# Patient Record
Sex: MALE | Race: WHITE | NOT HISPANIC OR LATINO | Employment: FULL TIME | URBAN - METROPOLITAN AREA
[De-identification: names, ages, dates, MRNs, and addresses within clinical notes are randomized per-mention and may not be internally consistent; named-entity substitution may affect disease eponyms.]

---

## 2023-06-08 ENCOUNTER — OFFICE VISIT (OUTPATIENT)
Dept: INTERNAL MEDICINE | Facility: CLINIC | Age: 43
End: 2023-06-08
Payer: COMMERCIAL

## 2023-06-08 VITALS
HEIGHT: 68 IN | TEMPERATURE: 98.4 F | HEART RATE: 80 BPM | WEIGHT: 211 LBS | BODY MASS INDEX: 31.98 KG/M2 | SYSTOLIC BLOOD PRESSURE: 128 MMHG | DIASTOLIC BLOOD PRESSURE: 84 MMHG

## 2023-06-08 DIAGNOSIS — F17.200 SMOKER: ICD-10-CM

## 2023-06-08 DIAGNOSIS — Z13.21 ENCOUNTER FOR VITAMIN DEFICIENCY SCREENING: ICD-10-CM

## 2023-06-08 DIAGNOSIS — M25.551 RIGHT HIP PAIN: ICD-10-CM

## 2023-06-08 DIAGNOSIS — Z13.29 SCREENING FOR ENDOCRINE DISORDER: ICD-10-CM

## 2023-06-08 DIAGNOSIS — D22.9 MULTIPLE ATYPICAL SKIN MOLES: ICD-10-CM

## 2023-06-08 DIAGNOSIS — Z11.59 ENCOUNTER FOR HEPATITIS C SCREENING TEST FOR LOW RISK PATIENT: ICD-10-CM

## 2023-06-08 DIAGNOSIS — Z13.220 LIPID SCREENING: ICD-10-CM

## 2023-06-08 DIAGNOSIS — R06.83 SNORING: ICD-10-CM

## 2023-06-08 DIAGNOSIS — Z13.0 SCREENING FOR DEFICIENCY ANEMIA: ICD-10-CM

## 2023-06-08 DIAGNOSIS — F32.A DEPRESSION, UNSPECIFIED DEPRESSION TYPE: ICD-10-CM

## 2023-06-08 DIAGNOSIS — R53.83 OTHER FATIGUE: ICD-10-CM

## 2023-06-08 DIAGNOSIS — Z00.00 ENCOUNTER FOR MEDICAL EXAMINATION TO ESTABLISH CARE: Primary | ICD-10-CM

## 2023-06-08 RX ORDER — ESCITALOPRAM OXALATE 10 MG/1
10 TABLET ORAL DAILY
Qty: 90 TABLET | Refills: 0 | Status: SHIPPED | OUTPATIENT
Start: 2023-06-08

## 2023-06-13 ENCOUNTER — TELEPHONE (OUTPATIENT)
Dept: INTERNAL MEDICINE | Facility: CLINIC | Age: 43
End: 2023-06-13

## 2023-06-14 ENCOUNTER — PATIENT ROUNDING (BHMG ONLY) (OUTPATIENT)
Dept: INTERNAL MEDICINE | Facility: CLINIC | Age: 43
End: 2023-06-14
Payer: COMMERCIAL

## 2023-08-03 ENCOUNTER — OFFICE VISIT (OUTPATIENT)
Dept: ORTHOPEDIC SURGERY | Facility: CLINIC | Age: 43
End: 2023-08-03
Payer: COMMERCIAL

## 2023-08-03 VITALS
SYSTOLIC BLOOD PRESSURE: 142 MMHG | HEIGHT: 69 IN | BODY MASS INDEX: 30.45 KG/M2 | DIASTOLIC BLOOD PRESSURE: 90 MMHG | WEIGHT: 205.6 LBS

## 2023-08-03 DIAGNOSIS — M70.61 TROCHANTERIC BURSITIS OF RIGHT HIP: Primary | ICD-10-CM

## 2023-08-03 DIAGNOSIS — M16.11 PRIMARY OSTEOARTHRITIS OF RIGHT HIP: ICD-10-CM

## 2023-08-03 PROCEDURE — 99212 OFFICE O/P EST SF 10 MIN: CPT | Performed by: ORTHOPAEDIC SURGERY

## 2023-08-14 ENCOUNTER — OFFICE VISIT (OUTPATIENT)
Dept: INTERNAL MEDICINE | Facility: CLINIC | Age: 43
End: 2023-08-14
Payer: COMMERCIAL

## 2023-08-14 VITALS
DIASTOLIC BLOOD PRESSURE: 62 MMHG | WEIGHT: 213 LBS | SYSTOLIC BLOOD PRESSURE: 114 MMHG | HEART RATE: 72 BPM | TEMPERATURE: 97.8 F | HEIGHT: 68 IN | BODY MASS INDEX: 32.28 KG/M2

## 2023-08-14 DIAGNOSIS — F32.A DEPRESSION, UNSPECIFIED DEPRESSION TYPE: ICD-10-CM

## 2023-08-14 DIAGNOSIS — Z00.00 ANNUAL PHYSICAL EXAM: Primary | ICD-10-CM

## 2023-08-14 DIAGNOSIS — M25.551 RIGHT HIP PAIN: ICD-10-CM

## 2023-08-14 DIAGNOSIS — F41.9 ANXIETY: ICD-10-CM

## 2023-08-14 PROCEDURE — 99214 OFFICE O/P EST MOD 30 MIN: CPT

## 2023-08-14 PROCEDURE — 99396 PREV VISIT EST AGE 40-64: CPT

## 2023-08-14 RX ORDER — ESCITALOPRAM OXALATE 20 MG/1
20 TABLET ORAL DAILY
Qty: 90 TABLET | Refills: 1 | Status: SHIPPED | OUTPATIENT
Start: 2023-08-14

## 2023-08-14 RX ORDER — MULTIPLE VITAMINS W/ MINERALS TAB 9MG-400MCG
1 TAB ORAL DAILY
COMMUNITY

## 2023-08-14 RX ORDER — BUSPIRONE HYDROCHLORIDE 5 MG/1
5 TABLET ORAL 3 TIMES DAILY
Qty: 90 TABLET | Refills: 1 | Status: SHIPPED | OUTPATIENT
Start: 2023-08-14

## 2024-01-10 ENCOUNTER — TELEPHONE (OUTPATIENT)
Dept: INTERNAL MEDICINE | Facility: CLINIC | Age: 44
End: 2024-01-10
Payer: COMMERCIAL

## 2024-01-11 DIAGNOSIS — R93.89 ABNORMAL CHEST X-RAY: Primary | ICD-10-CM

## 2024-02-19 ENCOUNTER — OFFICE VISIT (OUTPATIENT)
Dept: INTERNAL MEDICINE | Facility: CLINIC | Age: 44
End: 2024-02-19
Payer: COMMERCIAL

## 2024-02-19 VITALS
HEIGHT: 68 IN | BODY MASS INDEX: 33.04 KG/M2 | TEMPERATURE: 98.2 F | HEART RATE: 72 BPM | SYSTOLIC BLOOD PRESSURE: 128 MMHG | WEIGHT: 218 LBS | DIASTOLIC BLOOD PRESSURE: 76 MMHG

## 2024-02-19 DIAGNOSIS — F41.9 ANXIETY: ICD-10-CM

## 2024-02-19 DIAGNOSIS — F43.9 STRESS AT HOME: ICD-10-CM

## 2024-02-19 DIAGNOSIS — F32.A DEPRESSION, UNSPECIFIED DEPRESSION TYPE: Primary | ICD-10-CM

## 2024-02-19 PROCEDURE — 99213 OFFICE O/P EST LOW 20 MIN: CPT

## 2024-02-19 RX ORDER — BUSPIRONE HYDROCHLORIDE 5 MG/1
5 TABLET ORAL 3 TIMES DAILY
Qty: 90 TABLET | Refills: 1 | Status: SHIPPED | OUTPATIENT
Start: 2024-02-19

## 2024-02-19 RX ORDER — ESCITALOPRAM OXALATE 20 MG/1
20 TABLET ORAL DAILY
Qty: 90 TABLET | Refills: 1 | Status: SHIPPED | OUTPATIENT
Start: 2024-02-19

## 2025-01-22 ENCOUNTER — OFFICE VISIT (OUTPATIENT)
Age: 45
End: 2025-01-22

## 2025-01-22 VITALS
TEMPERATURE: 97.1 F | HEIGHT: 67 IN | HEART RATE: 78 BPM | SYSTOLIC BLOOD PRESSURE: 133 MMHG | BODY MASS INDEX: 32.18 KG/M2 | WEIGHT: 205 LBS | DIASTOLIC BLOOD PRESSURE: 87 MMHG | OXYGEN SATURATION: 98 %

## 2025-01-22 DIAGNOSIS — Z12.5 SCREENING FOR PROSTATE CANCER: ICD-10-CM

## 2025-01-22 DIAGNOSIS — Z13.21 ENCOUNTER FOR VITAMIN DEFICIENCY SCREENING: ICD-10-CM

## 2025-01-22 DIAGNOSIS — E78.5 HYPERLIPIDEMIA, UNSPECIFIED HYPERLIPIDEMIA TYPE: ICD-10-CM

## 2025-01-22 DIAGNOSIS — E56.1 VITAMIN K DEFICIENCY: ICD-10-CM

## 2025-01-22 DIAGNOSIS — Z12.11 SCREEN FOR COLON CANCER: Primary | ICD-10-CM

## 2025-01-22 DIAGNOSIS — R03.0 ELEVATED BLOOD PRESSURE READING: ICD-10-CM

## 2025-01-22 DIAGNOSIS — R53.83 FATIGUE, UNSPECIFIED TYPE: ICD-10-CM

## 2025-01-22 DIAGNOSIS — F34.1 PERSISTENT DEPRESSIVE DISORDER: ICD-10-CM

## 2025-01-22 DIAGNOSIS — Z72.0 TOBACCO ABUSE: ICD-10-CM

## 2025-01-22 RX ORDER — CITALOPRAM HYDROBROMIDE 10 MG/1
20 TABLET ORAL DAILY
Qty: 30 TABLET | Refills: 3 | Status: SHIPPED | OUTPATIENT
Start: 2025-01-22

## 2025-01-22 SDOH — ECONOMIC STABILITY: FOOD INSECURITY: WITHIN THE PAST 12 MONTHS, THE FOOD YOU BOUGHT JUST DIDN'T LAST AND YOU DIDN'T HAVE MONEY TO GET MORE.: NEVER TRUE

## 2025-01-22 SDOH — ECONOMIC STABILITY: FOOD INSECURITY: WITHIN THE PAST 12 MONTHS, YOU WORRIED THAT YOUR FOOD WOULD RUN OUT BEFORE YOU GOT MONEY TO BUY MORE.: NEVER TRUE

## 2025-01-22 ASSESSMENT — PATIENT HEALTH QUESTIONNAIRE - PHQ9
SUM OF ALL RESPONSES TO PHQ QUESTIONS 1-9: 0
SUM OF ALL RESPONSES TO PHQ9 QUESTIONS 1 & 2: 0
SUM OF ALL RESPONSES TO PHQ QUESTIONS 1-9: 0
SUM OF ALL RESPONSES TO PHQ QUESTIONS 1-9: 0
1. LITTLE INTEREST OR PLEASURE IN DOING THINGS: NOT AT ALL
2. FEELING DOWN, DEPRESSED OR HOPELESS: NOT AT ALL
SUM OF ALL RESPONSES TO PHQ QUESTIONS 1-9: 0

## 2025-01-22 NOTE — PROGRESS NOTES
[facility]     Date of Visit:  2025  Patient Name: Mode Qureshi   Patient :  1980  MRN:  [id number]      CHIEF COMPLAINT:     Mode Qureshi is a 44 y.o. male who is established who presents for:  Chief Complaint   Patient presents with    Establish Care    Hyperlipidemia     Patient has elevated cholesterol and was unable to afford his medication.     Joint Pain     Patient has several issues with joint pain and back pain.        HISTORY OF PRESENT ILLNESS:       HPI     REVIEW OF SYSTEMS:      Review of Systems     REVIEWED INFORMATION      Current Outpatient Medications   Medication Sig Dispense Refill    citalopram (CELEXA) 10 MG tablet Take 2 tablets by mouth daily 30 tablet 3     No current facility-administered medications for this visit.        Allergies   Allergen Reactions    Penicillins Anaphylaxis       There is no problem list on file for this patient.      Past Medical History:   Diagnosis Date    Hyperlipidemia        Past Surgical History:   Procedure Laterality Date    EYE SURGERY          Social History     Socioeconomic History    Marital status:      Spouse name: None    Number of children: None    Years of education: None    Highest education level: None   Tobacco Use    Smoking status: Every Day     Current packs/day: 1.00     Average packs/day: 1 pack/day for 19.1 years (19.1 ttl pk-yrs)     Types: Cigarettes     Start date:     Smokeless tobacco: Never   Vaping Use    Vaping status: Never Used   Substance and Sexual Activity    Alcohol use: Not Currently    Drug use: Not Currently    Sexual activity: Yes     Partners: Female     Social Determinants of Health     Food Insecurity: No Food Insecurity (2025)    Hunger Vital Sign     Worried About Running Out of Food in the Last Year: Never true     Ran Out of Food in the Last Year: Never true   Transportation Needs: No Transportation Needs (2025)    PRAPARE - Transportation     Lack of Transportation

## 2025-01-24 ENCOUNTER — TELEPHONE (OUTPATIENT)
Dept: SURGERY | Facility: CLINIC | Age: 45
End: 2025-01-24
Payer: COMMERCIAL

## 2025-01-24 RX ORDER — BISACODYL 5 MG/1
TABLET, DELAYED RELEASE ORAL
Qty: 4 TABLET | Refills: 0 | Status: SHIPPED | OUTPATIENT
Start: 2025-01-24

## 2025-01-24 RX ORDER — POLYETHYLENE GLYCOL 3350 17 G/17G
POWDER, FOR SOLUTION ORAL
Qty: 238 G | Refills: 0 | Status: SHIPPED | OUTPATIENT
Start: 2025-01-24

## 2025-01-24 NOTE — TELEPHONE ENCOUNTER
PRESCREENING FOR OPEN ACCESS SCHEDULING    Lenard Smart, 1980  [id number]    01/24/25    If, the patient answers yes to any of the following questions the provider will be informed prior to scheduling open access for approval and documented in the chart.    []  Yes  [x] No    1. Have you ever had a colonoscopy in the past?      When:        Where:       Polyps or other:     []  Yes  [x] No    2. Family history of colon cancer?      Relation:       Age of onset:       Do you currently have any of the following?    []  Yes  [x] No  Rectal bleeding, if so, how long?     []  Yes  [x] No  Abdominal pain, if so, how long?    []  Yes  [x] No  Constipation, if so, how long?    []  Yes  [x] No  Diarrhea, if so, how long?    []  Yes  [x] No  Weight loss, is so, how much?    [] Yes  [x] No  Small caliber stool, if so, how long?    []Yes  [x] No  Do you have Hemorroids?    []Yes  [x] No  Have you been diagnosed with Anemia?    []Yes  [x] No  Do you have difficulty swallowing?  []Yes  [x] No  Do you have a history of esophageal stricture or dilation?(If yes do not schedule with Dr Corbett)    []Yes  [x] No  Do you have acid reflux?    Have you ever had any of the following conditions?    [] Yes  [x] No  Heart attack?      When?       Last cardiac workup?     Current Cardiologist?     Blood thinners?    [] Yes  [x] No   Lung problems, asthma or COPD?  [] Yes  [x] No  Oxygen required?       [] Yes  [x] No  Stroke?     [] Yes  [x] No  Have you ever had a reaction to anesthesia?

## 2025-01-24 NOTE — TELEPHONE ENCOUNTER
Pt would like to be scheduled at [facility] on 03/14 W/ Dr. Whitten-verified pharmacy/insurance. Thank you.

## 2025-01-27 ENCOUNTER — PREP FOR SURGERY (OUTPATIENT)
Dept: OTHER | Facility: HOSPITAL | Age: 45
End: 2025-01-27
Payer: COMMERCIAL

## 2025-01-27 DIAGNOSIS — Z12.11 SCREENING FOR COLON CANCER: Primary | ICD-10-CM

## 2025-02-19 ENCOUNTER — TELEPHONE (OUTPATIENT)
Dept: SURGERY | Facility: CLINIC | Age: 45
End: 2025-02-19
Payer: COMMERCIAL

## 2025-02-19 NOTE — TELEPHONE ENCOUNTER
PT HAS BEEN RESCHEDULED TO 07/18/2025 @ [facility] PER PROVIDER REQUEST,JORGE IN [facility] WAS NOTIFIED

## 2025-02-19 NOTE — TELEPHONE ENCOUNTER
TRIED TO CALL PT TO RESCHEDULE HIS PROCEDURE ON 03/14/2025 @ [facility] PER PROVIDER REQUEST BUT NO ANSWER AN VM WAS FULL COULD NOT LEAVE A MESSAGE

## 2025-02-27 ENCOUNTER — TELEMEDICINE (OUTPATIENT)
Dept: PULMONOLOGY | Age: 45
End: 2025-02-27
Payer: COMMERCIAL

## 2025-02-27 VITALS
HEART RATE: 72 BPM | TEMPERATURE: 98.1 F | SYSTOLIC BLOOD PRESSURE: 143 MMHG | OXYGEN SATURATION: 96 % | DIASTOLIC BLOOD PRESSURE: 90 MMHG

## 2025-02-27 DIAGNOSIS — F17.210 CIGARETTE NICOTINE DEPENDENCE WITHOUT COMPLICATION: ICD-10-CM

## 2025-02-27 DIAGNOSIS — G47.10 HYPERSOMNIA: ICD-10-CM

## 2025-02-27 DIAGNOSIS — R06.83 SNORING: ICD-10-CM

## 2025-02-27 DIAGNOSIS — G47.33 OSA (OBSTRUCTIVE SLEEP APNEA): Primary | ICD-10-CM

## 2025-02-27 PROCEDURE — 99203 OFFICE O/P NEW LOW 30 MIN: CPT | Performed by: INTERNAL MEDICINE

## 2025-02-27 ASSESSMENT — ENCOUNTER SYMPTOMS
CHEST TIGHTNESS: 0
COUGH: 0
ANAL BLEEDING: 0
WHEEZING: 0
ABDOMINAL DISTENTION: 0
SHORTNESS OF BREATH: 0
ABDOMINAL PAIN: 0
RHINORRHEA: 0
APNEA: 0
BACK PAIN: 0

## 2025-02-27 NOTE — PROGRESS NOTES
Pulmonary and Sleep Medicine    Mode Qureshi (:  1980) is a 44 y.o. male,New patient, here for evaluation of the following chief complaint(s):  Fatigue      Referring physician:  No referring provider defined for this encounter.     ASSESSMENT/PLAN:  1. COLTON (obstructive sleep apnea)  -     Ambulatory referral to Sleep Medicine  -     Baseline Diagnostic Sleep Study; Future  -     Assessment of Daytime Sleepiness; Future  2. Cigarette nicotine dependence without complication  3. Hypersomnia  4. Snoring        It is likely that the patient has obstructive sleep apnea causing his daytime symptoms of hypersomnia.  Cannot exclude other etiologies of his hypersomnia such as primary hypersomnia or narcolepsy.  We will obtain MSLT.  Will reevaluate after his sleep study.  Avoid risky activities such as driving when sleepy.       Radha Tobar MD, [facility], [facility]    Return in about 4 weeks (around 3/27/2025).    SUBJECTIVE/OBJECTIVE:        The patient was evaluated today via video link regarding obstructive sleep apnea.  The patient's location is [facility] sleep lab in [facility].  My location is my office at [facility] in [place].  The patient reports having severe hypersomnia for at least 15 years.  He endorses falling asleep behind the wheel.  He also falls asleep while walking at work.  He says he would be performing a task and he would fall asleep while performing the task and he would wake up and still doing his same task.  He recently lost significant amount of weight due to increased physical activity.  He is a smoker.  He is witnessed to snore by his wife.  I was asked see him regarding the above.          Continue the following medications as reported by the patient:    Prior to Visit Medications    Medication Sig Taking? Authorizing Provider   citalopram (CELEXA) 10 MG tablet Take 2 tablets by mouth daily  Samaria Sutherland, DO        Review of

## 2025-03-13 ENCOUNTER — TELEMEDICINE (OUTPATIENT)
Age: 45
End: 2025-03-13

## 2025-03-13 DIAGNOSIS — J01.90 ACUTE SINUSITIS, RECURRENCE NOT SPECIFIED, UNSPECIFIED LOCATION: Primary | ICD-10-CM

## 2025-03-13 RX ORDER — CETIRIZINE HYDROCHLORIDE 10 MG/1
10 TABLET ORAL DAILY
Qty: 30 TABLET | Refills: 0 | Status: SHIPPED | OUTPATIENT
Start: 2025-03-13

## 2025-03-13 RX ORDER — FLUTICASONE PROPIONATE 50 MCG
2 SPRAY, SUSPENSION (ML) NASAL DAILY
Qty: 16 G | Refills: 0 | Status: SHIPPED | OUTPATIENT
Start: 2025-03-13

## 2025-03-13 RX ORDER — DOXYCYCLINE HYCLATE 100 MG
100 TABLET ORAL 2 TIMES DAILY
Qty: 10 TABLET | Refills: 0 | Status: SHIPPED | OUTPATIENT
Start: 2025-03-13 | End: 2025-03-18

## 2025-03-13 RX ORDER — GUAIFENESIN/DEXTROMETHORPHAN 100-10MG/5
5 SYRUP ORAL 3 TIMES DAILY PRN
Qty: 120 ML | Refills: 0 | Status: SHIPPED | OUTPATIENT
Start: 2025-03-13 | End: 2025-03-23

## 2025-03-13 NOTE — PROGRESS NOTES
Mode Qureshi (:  1980) is a 44 y.o. male,Established patient, here for evaluation of the following chief complaint(s):  Cough      ASSESSMENT/PLAN:  Assessment & Plan  Acute sinusitis, recurrence not specified, unspecified location      Assessment & Plan  1. Cough and rhinorrhea.  The symptoms suggest a viral etiology, given the absence of fever and the presence of a sore throat, runny nose, and headache. Prescriptions for doxycycline, a cough suppressant, and a decongestant will be provided. He is advised to commence the antibiotic treatment only if there is no improvement in his condition within the next few days or if his symptoms worsen.    Follow-up  The patient is scheduled for a follow-up visit on 2025.    No follow-ups on file.    SUBJECTIVE/OBJECTIVE:  History of Present Illness  The patient presents via virtual visit for evaluation of cough and runny nose.    He reports the onset of these symptoms two days prior, with a gradual worsening over time. He has not experienced any fever but acknowledges lower back pain, which he attributes to his pre-existing arthritis. The intensity of this pain has increased recently, describing it as a grinding sensation. He does not report any urinary symptoms such as dysuria or hematuria, and also does not report any gastrointestinal symptoms such as vomiting or diarrhea. His appetite and hydration status remain unaffected. He reports no expectoration but mentions lung soreness. He also reports increased fatigue, rhinorrhea, headache, and pharyngitis. He has not undergone any home testing for COVID-19. He is uncertain whether his symptoms are due to a common cold or allergies. He notes that his wife had been ill recently. He has not been monitoring his blood pressure at home. He has been managing his symptoms with Mucinex cough drops.    Supplemental Information  He is currently on Celexa.    MEDICATIONS  Celexa, Mucinex cough drops    Key

## 2025-03-15 ENCOUNTER — APPOINTMENT (OUTPATIENT)
Dept: GENERAL RADIOLOGY | Facility: HOSPITAL | Age: 45
End: 2025-03-15
Payer: COMMERCIAL

## 2025-03-15 ENCOUNTER — HOSPITAL ENCOUNTER (EMERGENCY)
Facility: HOSPITAL | Age: 45
Discharge: HOME | End: 2025-03-15
Attending: STUDENT IN AN ORGANIZED HEALTH CARE EDUCATION/TRAINING PROGRAM
Payer: COMMERCIAL

## 2025-03-15 VITALS
SYSTOLIC BLOOD PRESSURE: 161 MMHG | HEART RATE: 92 BPM | BODY MASS INDEX: 33.27 KG/M2 | HEIGHT: 67 IN | TEMPERATURE: 98.1 F | WEIGHT: 212 LBS | RESPIRATION RATE: 18 BRPM | OXYGEN SATURATION: 96 % | DIASTOLIC BLOOD PRESSURE: 92 MMHG

## 2025-03-15 DIAGNOSIS — J20.8 VIRAL BRONCHITIS: Primary | ICD-10-CM

## 2025-03-15 PROCEDURE — 71045 X-RAY EXAM CHEST 1 VIEW: CPT

## 2025-03-15 PROCEDURE — 99283 EMERGENCY DEPT VISIT LOW MDM: CPT | Performed by: STUDENT IN AN ORGANIZED HEALTH CARE EDUCATION/TRAINING PROGRAM

## 2025-03-15 PROCEDURE — 25010000002 DEXAMETHASONE PER 1 MG: Performed by: STUDENT IN AN ORGANIZED HEALTH CARE EDUCATION/TRAINING PROGRAM

## 2025-03-15 RX ORDER — KETOROLAC TROMETHAMINE 30 MG/ML
30 INJECTION, SOLUTION INTRAMUSCULAR; INTRAVENOUS ONCE
Status: DISCONTINUED | OUTPATIENT
Start: 2025-03-15 | End: 2025-03-15 | Stop reason: HOSPADM

## 2025-03-15 RX ADMIN — DEXAMETHASONE SODIUM PHOSPHATE 10 MG: 10 INJECTION INTRAMUSCULAR; INTRAVENOUS at 03:50

## 2025-03-15 NOTE — ED PROVIDER NOTES
[facility] EMERGENCY DEPARTMENT  Emergency Department Encounter  Emergency Medicine Physician Note       Pt Name: Lenard Smart  MRN: [id number]  Pt :   1980  Room Number:    Date of encounter:  3/15/2025  PCP: Jaylyn Leiva DO  ED Provider: Elian Salazar MD    Historian: Patient      HPI:  Chief Complaint: Flulike illness        Context: Lenard Smart is a 44 y.o. male who presents to the ED c/o flulike illness.  Patient states he been sick for around 9 days with cough, congestion, body aches and headaches.  Denies fevers.  Has been taking over-the-counter cough drops.  Was seen through telehealth by PCP and they prescribed doxycycline but told him not to start taking it yet.  They also prescribed him a cough medicine but he does not remember which 1.  Has not been taking the prescribed cough medicine except for at night.  No other symptoms reported at this time.  States he gets bronchitis at least once a year.      PAST MEDICAL HISTORY  Past Medical History:   Diagnosis Date    Arthritis     Hyperlipidemia          PAST SURGICAL HISTORY  Past Surgical History:   Procedure Laterality Date    EYE SURGERY Bilateral     2013 PPRK         FAMILY HISTORY  Family History   Problem Relation Age of Onset    Anxiety disorder Mother     Diabetes Mother     Heart attack Mother     Anxiety disorder Father     Diabetes Father     Hypertension Father     Heart attack Father     Liver cancer Father          SOCIAL HISTORY  Social History     Socioeconomic History    Marital status:    Tobacco Use    Smoking status: Every Day     Current packs/day: 1.00     Average packs/day: 1 pack/day for 19.2 years (19.2 ttl pk-yrs)     Types: Cigarettes     Start date:     Smokeless tobacco: Never   Substance and Sexual Activity    Alcohol use: Not Currently     Comment: rarely    Drug use: Never         ALLERGIES  Antivenin crotalidae polyvalent, Penicillins, and Erythromycin        REVIEW OF  SYSTEMS  Review of Systems     All systems reviewed and negative except for those discussed in HPI.       PHYSICAL EXAM    I have reviewed the triage vital signs and nursing notes.    ED Triage Vitals [03/15/25 0323]   Temp Heart Rate Resp BP SpO2   98.1 °F (36.7 °C) 92 18 161/92 96 %      Temp src Heart Rate Source Patient Position BP Location FiO2 (%)   Oral Monitor Sitting Left arm --       Physical Exam    General:  Awake, alert, no acute distress  HEENT: Atraumatic, normocephalic, EOMI, PERRLA, mucous membranes moist  NECK:  Supple, atraumatic  Cardiovascular:  Regular rate, regular rhythm, no murmurs, rubs, or gallops.  Extremities well perfused   Respiratory: Intermittent mild coughing, regular rate, clear lungs to auscultation bilaterally.  No rhonchi, rales, wheezing  Abdominal:  Soft, nondistended, nontender.  No guarding or rebound.  No palpable masses  Extremity:  No visible bony abnormalities in all 4 extremities.  Full range of motion of all extremities.  Skin:  Warm and dry.  No rashes  Neuro:  AAOx3, GCS 15. Cranial nerves 2-12 grossly intact.  No focal strength or sensation deficits.  Psych:  Mood and affect appropriate.                  PROCEDURES    Procedures    No orders to display       MEDICATIONS GIVEN IN ER    Medications   ketorolac (TORADOL) injection 30 mg (30 mg Intramuscular Not Given 3/15/25 0351)   dexAMETHasone (DECADRON) 10 MG/ML oral solution 10 mg (10 mg Oral Given 3/15/25 0350)         MEDICAL DECISION MAKING, PROGRESS, and CONSULTS    All labs, if obtained, have been independently reviewed by me.  All radiology studies, if obtained, have been evaluated by me and the radiologist dictating the report.  All EKG's, if obtained, have been independently viewed and interpreted by me.      Discussion below represents my analysis of pertinent findings related to patient's condition, differential diagnosis, treatment plan and final disposition.    Lenard Smart is a 44 y.o. male who  presents to the ED c/o flulike illness.  Hemodynamically stable nontoxic in appearance viral.  Symptoms likely secondary to viral bronchitis however differential also includes pneumonia.  Patient given IM Toradol for body aches.  Patient had already been prescribed Robitussin DM and Flonase along with prescription for doxycycline to be taken if symptoms do not improve within the next few days by his PCP.    Chest x-ray obtained which was personally visualized and interpreted showing no acute cardiopulmonary abnormalities.    Recommend continuing Robitussin and Flonase for symptomatic treatment.  Patient given one-time dose of dexamethasone in emergency department for bronchitis.  Recommended close follow-up with primary care provider as an outpatient.  Patient voiced understanding of plan and was discharged.                             Orders placed during this visit:  Orders Placed This Encounter   Procedures    XR Chest 1 View         ED Course:    Consultants: None                Shared Decision Making:  After my consideration of clinical presentation and any laboratory/radiology studies obtained, I discussed the findings with the patient/patient representative who is in agreement with the treatment plan and the final disposition.   Risks and benefits of discharge and/or observation/admission were discussed.      AS OF 05:18 EDT VITALS:    BP - 161/92  HR - 92  TEMP - 98.1 °F (36.7 °C) (Oral)  O2 SATS - 96%                  DIAGNOSIS  Final diagnoses:   Viral bronchitis         DISPOSITION  Discharge      Please note that portions of this document were completed with voice recognition software.        Elian Salazar MD  03/15/25 0518

## 2025-03-15 NOTE — DISCHARGE INSTRUCTIONS
Continue previously prescribed medication regimen.  Steroid given today in emergency department should last for approximately 3 days and help with inflammation/cough.  Follow-up with primary care provider.

## 2025-03-26 ENCOUNTER — OFFICE VISIT (OUTPATIENT)
Age: 45
End: 2025-03-26
Payer: COMMERCIAL

## 2025-03-26 VITALS — TEMPERATURE: 98.5 F | SYSTOLIC BLOOD PRESSURE: 113 MMHG | DIASTOLIC BLOOD PRESSURE: 75 MMHG | HEART RATE: 73 BPM

## 2025-03-26 DIAGNOSIS — L60.0 INGROWN TOENAIL: ICD-10-CM

## 2025-03-26 DIAGNOSIS — F41.9 ANXIETY AND DEPRESSION: Primary | ICD-10-CM

## 2025-03-26 DIAGNOSIS — E78.5 HYPERLIPIDEMIA, UNSPECIFIED HYPERLIPIDEMIA TYPE: ICD-10-CM

## 2025-03-26 DIAGNOSIS — G80.9 CEREBRAL PALSY, UNSPECIFIED TYPE (HCC): ICD-10-CM

## 2025-03-26 DIAGNOSIS — Z11.4 SCREENING FOR HIV WITHOUT PRESENCE OF RISK FACTORS: ICD-10-CM

## 2025-03-26 DIAGNOSIS — F32.A ANXIETY AND DEPRESSION: Primary | ICD-10-CM

## 2025-03-26 DIAGNOSIS — Z72.0 TOBACCO ABUSE: ICD-10-CM

## 2025-03-26 DIAGNOSIS — Z13.1 ENCOUNTER FOR SCREENING FOR DIABETES MELLITUS: ICD-10-CM

## 2025-03-26 DIAGNOSIS — Z87.39 HISTORY OF DEGENERATIVE DISC DISEASE: ICD-10-CM

## 2025-03-26 DIAGNOSIS — Z00.00 HEALTH MAINTENANCE EXAMINATION: ICD-10-CM

## 2025-03-26 DIAGNOSIS — M19.90 ARTHRITIS: ICD-10-CM

## 2025-03-26 DIAGNOSIS — Z11.59 NEED FOR HEPATITIS C SCREENING TEST: ICD-10-CM

## 2025-03-26 DIAGNOSIS — D22.9 NEVUS: ICD-10-CM

## 2025-03-26 PROCEDURE — 99406 BEHAV CHNG SMOKING 3-10 MIN: CPT | Performed by: INTERNAL MEDICINE

## 2025-03-26 PROCEDURE — 99214 OFFICE O/P EST MOD 30 MIN: CPT | Performed by: INTERNAL MEDICINE

## 2025-03-26 RX ORDER — NICOTINE 21 MG/24HR
1 PATCH, TRANSDERMAL 24 HOURS TRANSDERMAL DAILY
Qty: 42 PATCH | Refills: 0 | Status: SHIPPED | OUTPATIENT
Start: 2025-03-26 | End: 2025-05-07

## 2025-03-26 RX ORDER — DULOXETIN HYDROCHLORIDE 60 MG/1
60 CAPSULE, DELAYED RELEASE ORAL DAILY
Qty: 90 CAPSULE | Refills: 1 | Status: SHIPPED | OUTPATIENT
Start: 2025-03-26

## 2025-03-26 NOTE — PROGRESS NOTES
Chief Complaint   Patient presents with    Depression     Patient is here today for a follow up. He has lost some weight and started a second job since his last visit. Patient has some issues with being tired on the celexa.     Hip Pain     Patient is having some issues with hip pain and back pain. He would like to get some paper work filled out for his work.        Have you seen any other physician or provider since your last visit yes - patient was seen in the ER for bronchitis    Have you had any other diagnostic tests since your last visit? yes - xray    Have you changed or stopped any medications since your last visit? yes - prednisone.

## 2025-03-26 NOTE — PROGRESS NOTES
Mode Qureshi (:  1980) is a 44 y.o. male,Established patient, here for evaluation of the following chief complaint(s):  Depression (Patient is here today for a follow up. He has lost some weight and started a second job since his last visit. Patient has some issues with being tired on the celexa. ) and Hip Pain (Patient is having some issues with hip pain and back pain. He would like to get some paper work filled out for his work. )      ASSESSMENT/PLAN:  Assessment & Plan  Anxiety and depression   He reports stress-related anxiety and stopped taking Celexa about a week ago due to it making him sleepy. Cymbalta will be prescribed to manage his anxiety symptoms.  Cerebral palsy, unspecified type (HCC)   He was diagnosed with cerebral palsy from a young age and got off disability in 2019. No new treatment was discussed during this visit.  Nevus   He has skin tags under his arms and behind his ear, along with little black spots on his back. A referral to dermatology will be made for further evaluation and management. Yearly skin checks with a dermatologist are recommended.  Tobacco abuse   smoking cessation counseling was provided today.  Patient was given literature for local program where nicotine patches are provided for free along with monetary incentive and counseling to assist with smoking cessation.  I spent 4-10 minutes on smoking cessation counseling.  Arthritis  Cymbalta will be prescribed to help manage his musculoskeletal pain.  He reports that cold weather exacerbates his pain, but warmer weather provides some relief. He experiences significant pain at times, making it difficult to stand or even tolerate a blanket touching his back. He has tried chiropractic care and physical therapy without much success. Cymbalta will be prescribed to manage his musculoskeletal pain and anxiety symptoms.  Health maintenance examination   He is scheduled for a colonoscopy in 2025. A one-time screening

## 2025-04-01 ENCOUNTER — HOSPITAL ENCOUNTER (EMERGENCY)
Facility: HOSPITAL | Age: 45
Discharge: HOME | End: 2025-04-01
Attending: EMERGENCY MEDICINE | Admitting: EMERGENCY MEDICINE
Payer: COMMERCIAL

## 2025-04-01 VITALS
DIASTOLIC BLOOD PRESSURE: 99 MMHG | SYSTOLIC BLOOD PRESSURE: 141 MMHG | WEIGHT: 211.8 LBS | HEART RATE: 65 BPM | OXYGEN SATURATION: 100 % | HEIGHT: 67 IN | BODY MASS INDEX: 33.24 KG/M2 | TEMPERATURE: 97.6 F | RESPIRATION RATE: 20 BRPM

## 2025-04-01 DIAGNOSIS — R51.9 ACUTE NONINTRACTABLE HEADACHE, UNSPECIFIED HEADACHE TYPE: ICD-10-CM

## 2025-04-01 DIAGNOSIS — R05.1 ACUTE COUGH: Primary | ICD-10-CM

## 2025-04-01 LAB
FLUAV SUBTYP SPEC NAA+PROBE: NOT DETECTED
FLUBV RNA ISLT QL NAA+PROBE: NOT DETECTED
SARS-COV-2 RNA RESP QL NAA+PROBE: NOT DETECTED

## 2025-04-01 PROCEDURE — 99283 EMERGENCY DEPT VISIT LOW MDM: CPT | Performed by: EMERGENCY MEDICINE

## 2025-04-01 PROCEDURE — 87636 SARSCOV2 & INF A&B AMP PRB: CPT | Performed by: EMERGENCY MEDICINE

## 2025-04-01 RX ORDER — KETOROLAC TROMETHAMINE 30 MG/ML
60 INJECTION, SOLUTION INTRAMUSCULAR; INTRAVENOUS ONCE
Status: DISCONTINUED | OUTPATIENT
Start: 2025-04-01 | End: 2025-04-01 | Stop reason: HOSPADM

## 2025-04-01 RX ORDER — ALBUTEROL SULFATE 90 UG/1
2 INHALANT RESPIRATORY (INHALATION) ONCE
Status: COMPLETED | OUTPATIENT
Start: 2025-04-01 | End: 2025-04-01

## 2025-04-01 RX ORDER — DOXYCYCLINE 100 MG/1
100 CAPSULE ORAL ONCE
Status: COMPLETED | OUTPATIENT
Start: 2025-04-01 | End: 2025-04-01

## 2025-04-01 RX ORDER — DOXYCYCLINE 100 MG/1
100 CAPSULE ORAL 2 TIMES DAILY
Qty: 14 CAPSULE | Refills: 0 | Status: SHIPPED | OUTPATIENT
Start: 2025-04-01 | End: 2025-04-08

## 2025-04-01 RX ORDER — ALBUTEROL SULFATE 90 UG/1
2 INHALANT RESPIRATORY (INHALATION) EVERY 4 HOURS PRN
Qty: 1 G | Refills: 0 | Status: SHIPPED | OUTPATIENT
Start: 2025-04-01 | End: 2025-04-03

## 2025-04-01 RX ADMIN — DOXYCYCLINE 100 MG: 100 CAPSULE ORAL at 04:09

## 2025-04-01 RX ADMIN — ALBUTEROL SULFATE 2 PUFF: 90 AEROSOL, METERED RESPIRATORY (INHALATION) at 04:09

## 2025-04-01 NOTE — ED PROVIDER NOTES
EMERGENCY DEPARTMENT ENCOUNTER    Pt Name: Lenard Smart  MRN: [id number]  Pt :   1980  Room Number:    Date of encounter:  2025  PCP: Jaylyn Leiva DO  ED Provider: El Vega MD    Historian: Patient      HPI:  Chief Complaint: Cough and headache        Context: Lenard Smart is a 44 y.o. male who presents to the ED c/o cough and headache.  Patient says for the past couple weeks he has had persistent cough.  He says this is intermittently productive for sputum.  No associated hemoptysis or chest pain.  Denies fever or chills.  No posttussive emesis. Describes headache as dull and throbbing in nature.  Says he thinks he has a headache because he has been unable to sleep secondary to coughing so much.  Patient says he was prescribed doxycycline by his primary care provider previously but did not get it filled.      PAST MEDICAL HISTORY  Past Medical History:   Diagnosis Date    Arthritis     Hyperlipidemia          PAST SURGICAL HISTORY  Past Surgical History:   Procedure Laterality Date    EYE SURGERY Bilateral      PPRK         FAMILY HISTORY  Family History   Problem Relation Age of Onset    Anxiety disorder Mother     Diabetes Mother     Heart attack Mother     Anxiety disorder Father     Diabetes Father     Hypertension Father     Heart attack Father     Liver cancer Father          SOCIAL HISTORY  Social History     Socioeconomic History    Marital status:    Tobacco Use    Smoking status: Every Day     Current packs/day: 1.00     Average packs/day: 1 pack/day for 19.2 years (19.2 ttl pk-yrs)     Types: Cigarettes     Start date:     Smokeless tobacco: Never   Substance and Sexual Activity    Alcohol use: Not Currently     Comment: rarely    Drug use: Never         ALLERGIES  Antivenin crotalidae polyvalent, Penicillins, and Erythromycin        REVIEW OF SYSTEMS    All systems reviewed and negative except for those discussed in HPI.       PHYSICAL EXAM    I have  reviewed the triage vital signs and nursing notes.    ED Triage Vitals [04/01/25 0318]   Temp Heart Rate Resp BP SpO2   97.6 °F (36.4 °C) 65 20 141/99 100 %      Temp src Heart Rate Source Patient Position BP Location FiO2 (%)   Oral Monitor Sitting Left arm --         General: no acute distress, well-appearing, non-toxic  Skin: normal color, warm and dry  Head: normocephalic, atraumatic  Ears: normal tympanic membranes bilaterally.  No external auditory canal erythema bilaterally.  Nose: normal nasal mucosa, no visible deformity.  Mouth: moist mucous membranes. No posterior pharyngeal erythema, swelling or exudates.  Neck: supple.  Chest: no retractions, no visible deformity  Cardiovascular: Regular rate and rhythm.  Lungs: Minimal and expiratory wheeze in the bases bilaterally.  No rales or rhonchi bilaterally.  Abdomen: soft, non-tender, non-distended. No rebound tenderness, no guarding.  No peritonitis.  Neuro:  alert and oriented x3, no focal neurological deficits.  Psych:  appropriate mood and behavior.        LAB RESULTS  Recent Results (from the past 24 hours)   COVID-19 and FLU A/B PCR, 1 HR TAT - Swab, Nasopharynx    Collection Time: 04/01/25  3:24 AM    Specimen: Nasopharynx; Swab   Result Value Ref Range    COVID19 Not Detected Not Detected - Ref. Range    Influenza A PCR Not Detected Not Detected    Influenza B PCR Not Detected Not Detected       If labs were ordered, I independently reviewed the results and considered them in treating the patient.  See medical decision making discussion section for my interpretation of lab results.        RADIOLOGY  No Radiology Exams Resulted Within Past 24 Hours      PROCEDURES    Procedures    No orders to display       MEDICATIONS GIVEN IN ER    Medications   albuterol sulfate HFA (PROVENTIL HFA;VENTOLIN HFA;PROAIR HFA) inhaler 2 puff (has no administration in time range)   doxycycline (MONODOX) capsule 100 mg (has no administration in time range)   ketorolac  (TORADOL) injection 60 mg (has no administration in time range)         MEDICAL DECISION MAKING, PROGRESS, and CONSULTS    All labs, if obtained, have been independently reviewed by me.  All radiology studies, if obtained, have been reviewed by me and the radiologist dictating the report.  All EKG's, if obtained, have been independently viewed and interpreted by me/my attending physician.      Discussion below represents my analysis of pertinent findings related to patient's condition, differential diagnosis, treatment plan and final disposition.                         Differential diagnosis:    Differential includes bronchitis, pneumonia, viral syndrome, other acute emergency.    Medical Decision Making Discussion:    Vitals reviewed and are normal.    Patient had chest radiograph recently which was read as bronchitis.  Given patient's persistent cough that is intermittently productive for sputum and has not had improvement despite previous therapies will place the patient on antibiotics.  Was also given albuterol inhaler given slight end expiratory wheeze.    Patient struck to follow-up closely with primary care and to return to the emerged part before then for any concerning symptoms, worsening symptoms or new concerns.    Additional sources:    - External (non-ED) record review: Internal medicine note from March 26 documenting anxiety, depression, cerebral palsy, arthritis    Shared Decision Making:  After my consideration of clinical presentation and any laboratory/radiology studies obtained, I discussed the findings with the patient/patient representative who is in agreement with the treatment plan and the final disposition.   Risks and benefits of discharge and/or observation/admission were discussed.    Orders placed during this visit:  Orders Placed This Encounter   Procedures    COVID-19 and FLU A/B PCR, 1 HR TAT - Swab, Nasopharynx       AS OF 04:06 EDT VITALS:    BP - 141/99  HR - 65  TEMP - 97.6 °F  (36.4 °C) (Oral)  O2 SATS - 100%                  DIAGNOSIS  Final diagnoses:   Acute cough   Acute nonintractable headache, unspecified headache type         DISPOSITION  Discharge      Please note that portions of this document were completed with voice recognition software.        El Vega MD  04/01/25 6659

## 2025-06-06 ENCOUNTER — OFFICE VISIT (OUTPATIENT)
Age: 45
End: 2025-06-06

## 2025-06-06 VITALS
TEMPERATURE: 98 F | WEIGHT: 206 LBS | HEART RATE: 75 BPM | OXYGEN SATURATION: 97 % | SYSTOLIC BLOOD PRESSURE: 131 MMHG | DIASTOLIC BLOOD PRESSURE: 73 MMHG | BODY MASS INDEX: 32.26 KG/M2

## 2025-06-06 DIAGNOSIS — H66.90 ACUTE OTITIS MEDIA, UNSPECIFIED OTITIS MEDIA TYPE: Primary | ICD-10-CM

## 2025-06-06 DIAGNOSIS — G80.9 CEREBRAL PALSY, UNSPECIFIED TYPE (HCC): ICD-10-CM

## 2025-06-06 DIAGNOSIS — Z00.00 HEALTHCARE MAINTENANCE: ICD-10-CM

## 2025-06-06 DIAGNOSIS — H61.22 IMPACTED CERUMEN OF LEFT EAR: ICD-10-CM

## 2025-06-06 RX ORDER — AZITHROMYCIN 250 MG/1
TABLET, FILM COATED ORAL
Qty: 6 TABLET | Refills: 0 | Status: SHIPPED | OUTPATIENT
Start: 2025-06-06 | End: 2025-06-16

## 2025-06-06 NOTE — PROGRESS NOTES
Mode Qureshi (:  1980) is a 44 y.o. male,Established patient, here for evaluation of the following chief complaint(s):  Cough and Congestion (Patient has missed 3 days of work and filed for short term disability. He has a cough, congestion, low temp at home. Neg for n/v/d. He is tired and weak. Patient states he has not had any money to get his meds. )      ASSESSMENT/PLAN:  Assessment & Plan  Acute otitis media, unspecified otitis media type   His symptoms suggest a viral etiology, although a bacterial component can not be ruled out. There is no evidence of pneumonia at this time. He is advised to continue with Mucinex and Sudafed to alleviate sinus congestion. A prescription for a 5-day course of Z-Tony (azithromycin) will be sent to [facility]. He is encouraged to rest over the weekend, maintain adequate hydration, and use over-the-counter analgesics such as ibuprofen or Tylenol as needed. If symptoms do not improve, it may indicate a viral infection that needs to run its course.    Impacted cerumen of left ear   His left ear is completely occluded with wax, and the right ear has some fluid and is a little pink. Due to his current sinus symptoms, ear cleaning is not recommended at this time as it may cause discomfort. He is advised to return for ear cleaning once his symptoms have resolved.    Cerebral palsy, unspecified type (HCC)  He reports that chiropractic treatment has worsened his condition. He has tried physical therapy, including water therapy, without significant improvement. A referral for physical therapy will be considered if he is open to trying it again.    Healthcare maintenance   He is still scheduled for a colonoscopy next month. He has not done his blood work yet. He is advised to complete his blood work before leaving today.    Return if symptoms worsen or fail to improve.    SUBJECTIVE/OBJECTIVE:  History of Present Illness  The patient is a 44-year-old male who

## 2025-06-06 NOTE — PROGRESS NOTES
Chief Complaint   Patient presents with    Cough    Congestion     Patient has missed 3 days of work and filed for short term disability. He has a cough, congestion, low temp at home. Neg for n/v/d. He is tired and weak. Patient states he has not had any money to get his meds.        Have you seen any other physician or provider since your last visit no    Have you had any other diagnostic tests since your last visit? no    Have you changed or stopped any medications since your last visit? no

## 2025-06-26 ENCOUNTER — HOSPITAL ENCOUNTER (OUTPATIENT)
Facility: HOSPITAL | Age: 45
Discharge: HOME | End: 2025-06-26
Payer: COMMERCIAL

## 2025-06-26 ENCOUNTER — OFFICE VISIT (OUTPATIENT)
Age: 45
End: 2025-06-26
Payer: COMMERCIAL

## 2025-06-26 ENCOUNTER — HOSPITAL ENCOUNTER (OUTPATIENT)
Dept: GENERAL RADIOLOGY | Facility: HOSPITAL | Age: 45
Discharge: HOME | End: 2025-06-26
Payer: COMMERCIAL

## 2025-06-26 VITALS
HEART RATE: 70 BPM | DIASTOLIC BLOOD PRESSURE: 78 MMHG | TEMPERATURE: 97.6 F | SYSTOLIC BLOOD PRESSURE: 115 MMHG | WEIGHT: 207 LBS | OXYGEN SATURATION: 97 % | BODY MASS INDEX: 32.42 KG/M2

## 2025-06-26 DIAGNOSIS — M25.512 ACUTE PAIN OF LEFT SHOULDER: Primary | ICD-10-CM

## 2025-06-26 DIAGNOSIS — M25.512 ACUTE PAIN OF LEFT SHOULDER: ICD-10-CM

## 2025-06-26 PROCEDURE — 99214 OFFICE O/P EST MOD 30 MIN: CPT | Performed by: INTERNAL MEDICINE

## 2025-06-26 PROCEDURE — 73030 X-RAY EXAM OF SHOULDER: CPT

## 2025-06-26 RX ORDER — CYCLOBENZAPRINE HCL 10 MG
10 TABLET ORAL 3 TIMES DAILY PRN
Qty: 21 TABLET | Refills: 0 | Status: SHIPPED | OUTPATIENT
Start: 2025-06-26 | End: 2025-07-06

## 2025-06-26 NOTE — PROGRESS NOTES
Chief Complaint   Patient presents with    Shoulder Pain     Left shoulder pain x 1 month. Patient has seen the chiropracter and it has caused him pain since. He is having issues with the pain and weakness. Patient has not been taking his medication due to financial issues.          Have you seen any other physician or provider since your last visit yes - patient saw a nurse at his work and she evaluated it and felt he had a sprain in his shoulder.     Have you had any other diagnostic tests since your last visit? no    Have you changed or stopped any medications since your last visit? no              
tendon to humeral head, pain with external rotation of left shoulder   Skin:     General: Skin is warm and dry.      Capillary Refill: Capillary refill takes less than 2 seconds.      Coloration: Skin is not jaundiced.      Findings: No rash.   Neurological:      General: No focal deficit present.      Mental Status: He is alert and oriented to person, place, and time. Mental status is at baseline.   Psychiatric:         Mood and Affect: Mood normal.         Behavior: Behavior normal.         Thought Content: Thought content normal.         Judgment: Judgment normal.       Vitals:    06/26/25 1306   BP: 115/78   Pulse: 70   Temp: 97.6 °F (36.4 °C)   SpO2: 97%   Weight: 93.9 kg (207 lb)        Results      No results found for: \"LABA1C\"    No results found for: \"NA\", \"K\", \"CL\", \"CO2\", \"BUN\", \"CREATININE\", \"GLUCOSE\", \"CALCIUM\", \"LABALBU\", \"BILITOT\", \"ALKPHOS\", \"AST\", \"ALT\", \"LABGLOM\", \"GFRAA\", \"AGRATIO\", \"GLOB\"    No results found for: \"ALBUMINUR\", \"LABCREAU\", \"ALBCREAT\"    No results found for: \"CHOLFAST\", \"CHOL\", \"TRIG\", \"HDL\", \"LDL\", \"VLDL\", \"CHOLHDLRATIO\"        The patient (or guardian, if applicable) and other individuals in attendance with the patient were advised that Artificial Intelligence will be utilized during this visit to record, process the conversation to generate a clinical note, and support improvement of the AI technology. The patient (or guardian, if applicable) and other individuals in attendance at the appointment consented to the use of AI, including the recording.      An electronic signature was used to authenticate this note.    --Samaria Sutherland DO

## 2025-06-27 ENCOUNTER — RESULTS FOLLOW-UP (OUTPATIENT)
Age: 45
End: 2025-06-27

## 2025-07-09 ENCOUNTER — TELEPHONE (OUTPATIENT)
Age: 45
End: 2025-07-09

## 2025-07-09 NOTE — TELEPHONE ENCOUNTER
Called patient and he has not been to physical therapy. He asked due to someone reporting him at work for doing work at home. He just wanted to make sure he didn't have any restrictions at this time at work or home. I advised him that he didn't not have restrictions at home or work at this time.

## 2025-07-14 ENCOUNTER — TELEPHONE (OUTPATIENT)
Dept: SURGERY | Facility: CLINIC | Age: 45
End: 2025-07-14
Payer: COMMERCIAL

## 2025-07-14 NOTE — TELEPHONE ENCOUNTER
Pt called in regards to proc with Dr. Whitten on 07/18/2025. Pt is wanting to cancel proc and does not want to r/s at this time.

## 2025-08-14 ENCOUNTER — OFFICE VISIT (OUTPATIENT)
Age: 45
End: 2025-08-14
Payer: COMMERCIAL

## 2025-08-14 VITALS
SYSTOLIC BLOOD PRESSURE: 135 MMHG | WEIGHT: 203 LBS | DIASTOLIC BLOOD PRESSURE: 84 MMHG | HEART RATE: 74 BPM | OXYGEN SATURATION: 98 % | BODY MASS INDEX: 31.79 KG/M2 | TEMPERATURE: 97.5 F

## 2025-08-14 DIAGNOSIS — F41.9 ANXIETY AND DEPRESSION: ICD-10-CM

## 2025-08-14 DIAGNOSIS — F32.A ANXIETY AND DEPRESSION: ICD-10-CM

## 2025-08-14 DIAGNOSIS — B37.9 YEAST INFECTION: Primary | ICD-10-CM

## 2025-08-14 DIAGNOSIS — Z91.199 HISTORY OF NONCOMPLIANCE WITH MEDICAL TREATMENT: ICD-10-CM

## 2025-08-14 DIAGNOSIS — G89.29 OTHER CHRONIC PAIN: ICD-10-CM

## 2025-08-14 PROCEDURE — 99214 OFFICE O/P EST MOD 30 MIN: CPT | Performed by: INTERNAL MEDICINE

## 2025-08-14 RX ORDER — DULOXETIN HYDROCHLORIDE 60 MG/1
60 CAPSULE, DELAYED RELEASE ORAL DAILY
Qty: 90 CAPSULE | Refills: 1 | Status: SHIPPED | OUTPATIENT
Start: 2025-08-14 | End: 2025-08-14

## 2025-08-14 RX ORDER — FLUCONAZOLE 150 MG/1
150 TABLET ORAL ONCE
Qty: 1 TABLET | Refills: 0 | Status: SHIPPED | OUTPATIENT
Start: 2025-08-14 | End: 2025-08-14

## 2025-08-14 RX ORDER — DULOXETIN HYDROCHLORIDE 60 MG/1
60 CAPSULE, DELAYED RELEASE ORAL DAILY
Qty: 90 CAPSULE | Refills: 3 | Status: SHIPPED | OUTPATIENT
Start: 2025-08-14